# Patient Record
Sex: MALE | Race: WHITE | URBAN - METROPOLITAN AREA
[De-identification: names, ages, dates, MRNs, and addresses within clinical notes are randomized per-mention and may not be internally consistent; named-entity substitution may affect disease eponyms.]

---

## 2018-07-30 ENCOUNTER — HOSPITAL ENCOUNTER (OUTPATIENT)
Dept: OCCUPATIONAL THERAPY | Facility: CLINIC | Age: 38
Setting detail: THERAPIES SERIES
End: 2018-07-30
Attending: PHYSICAL MEDICINE & REHABILITATION
Payer: COMMERCIAL

## 2018-07-30 PROCEDURE — 97542 WHEELCHAIR MNGMENT TRAINING: CPT | Mod: GO | Performed by: OCCUPATIONAL THERAPIST

## 2018-07-30 PROCEDURE — 40000132 ZZH STATISTIC OT SEATING/WHEELED MOBILITY VISIT: Performed by: OCCUPATIONAL THERAPIST

## 2018-07-31 NOTE — PROGRESS NOTES
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 No   Have you fallen and had an injury in the past year? Yes  (yes femur fracture with fall in shower)   Is patient a fall risk? Yes   Medical History   Onset Of Illness/injury Or Date Of Surgery 6/20/118  (order)   Medical Diagnosis ALS   Recent or Planned Surgeries Femur fracture   Home Accessibility   Living Environment House   Primary Entrance Covered Ramp   All Rooms Wheelchair Accessible Yes   Community ADL   Transportation Adapted Vehicle   Adapted Vehicle Features Side Entry;Ramp;Lock Down System  (ez lock and tie downs)   Community Mobility Requirements Medical Appointments;Shopping;Mosque   Cognitive/Visual/Hearing Status   Observations No Problems Observed During Evaluation   Vision Intact   Hearing Intact   ADL Status   Feeding Independent   Grooming/Hygiene Independent   Dressing Requires Assist   Toileting Requires Assist;Uses Equipment  (grab bars, raised commod)   Bathing Requires Assist;Uses Equipment  (grab bars, )   Meal Preparation Unable   Home Management Unable   Fatigue   Fatigue Measure Score needs to take daily naps   Balance   Unsupported Sitting Balance Uses Upper Extremities for " Balance   Sitting Balance in Chair Uses Upper Extremities for Balance   Standing Balance Physical Assist Required   Ambulation   Ambulation Non Ambulatory   Ambulation Assist Requires Assist   Ambulation Equipment 2 Wheeled Walker   Ambulation Comments For exercise only a few steps at home   Transfers   Transfer Assist Moderate Assist;Independent   Transfer Method Stand Pivot   Transfer Comments Uses 2ww to pull up on and push from chair   Wheelchair Ability   Wheelchair Ability Quick Adds Manual Chair;Wheelchair Use   Manual Wheelchair Propulsion   Manual Wheelchair Propulsion Assist   Wheelchair Use   Ability to Perform Weight Shifts Assist   Bed Confined Without Wheelchair? Yes   Hours in Wheelchair Daily 10   Hours Spent Alone Daily 2   Neuromuscular   History of Pressure Sores No   Pain No   Coordination UE Intact;LE Impaired   Tone Spasticity  (LE, no longer taking baclofen)   Sensory Deficits Reported wfl   Head and Neck   Head and Neck Position Functional   Head Control Adequate   Upper Extremities   UE ROM full rom   UE Strength 4/5   Dominance Right   Pelvis   Anterior/Posterior Pelvis Position Posterior Tilt   Trunk   Anterior/Posterior Trunk Position Increased Thoracic Kyphosis   Lower Extremities   Hip Position Adducted;Partially Flexible   LE ROM L hip limited to 90 degrees active range, right hip full range, right knee full range althought weak at hip, knee lacking full extension   LE Strength R 3+/5 L 2+/5   Foot Positioning Plantar flexed   Patient Measurements   Knee to Heel (inches) 22   Hip to Knee (inches) 20   Hip to Elbow (inches) 11   Hip to Underarm (inches) 20   Hip to Shoulder (inches) 25   Hip to Head (inches) 34   Hip to Hip (inches) 15.5   Chest Width (inches) 14   Shoulder Width (inches) 19   Education Assessment   Barriers to Learning Physical   Preferred Learning Style Listening;Demonstration   Assessment/Plan   Criteria for Skilled Interventions Met Yes, Treatment Indicated    Treatment Diagnosis progressive weakness limits safe and independent participation with all MRADLS   Therapy Frequency once   Planned Therapy Interventions Wheelchair Management/Propulsion Training   Planned Therapy Interventions Comments Educated on process to obtain power w/c.  Demo'd use of chair and all seat functions.  Pool chair requested.   Risks and benefits of treatment have been explained Yes   Patient/family & other staff in agreement with plan of care Yes   Comments pool chair to be delivered by Peterson   Session Time   Total Treatment Time 90   Total Session Time 90   Certification   Certification date from 07/30/18   Certification date to 07/30/18   Adult OT Eval Goals   OT Eval Goals (Adult) 1   OT Goal 1   Goal Identifier power mobility   Goal Description Patient to demonstrate a successful clinical trial of the recommended wheelchair   Target Date 07/30/18   Electronically signed by:  Ruby REYES/AISSATOU, ATP      Occupational Therapist, Assistive   [phone]      fax: [phone]      [email or web address]  Seating Clinic- [facility], [address]  [address]  [place] [address]

## 2018-09-14 NOTE — PROGRESS NOTES
REQUISITION AND JUSTIFICATION FOR DURABLE MEDICAL EQUIPMENT    Patient Name:  Abe Dee  MR #:  [id number]  :  1980  Age/Gender:  37 year old male  Visit Date:  Abe Dee seen for seating and wheeled mobility evaluation by Ruby Roman OTR/L,ATP and ATP from [facility] on 18.    CLINICAL CRITERIA FOR MOBILITY ASSISTIVE EQUIPMENT  Coverage Criteria Per Local Coverage Determination  A) Abe has mobility limitations due to Amyotrophic Lateral Sclerosis (ALS) and femur fracture that significantly impairs his ability to participate in all of his mobility-related activities of daily living (MRADL). Specifically affected are toileting (being able to get there in time to prevent accidents), dressing, and bathing (getting into the bathroom of designated place). Current equipment used is a standard manual wheelchair and two wheeled walker. This patient needs the new equipment requested to be able to increase safe and independent participation in all MRADLS in a timley manner free from falls/injury. Please see additional documentation in the seating and wheeled mobility report for details.   Abe had a successful clinical trial here, and also a successful trial at home with the recommended equipment. Abe is very willing and physically / cognitively able to use the recommended equipment to assist his with mobility-related activities of daily living and general mobility. A group 3 power wheelchair is being requested because it has better suspension for a smooth ride and has the capabilities of expandable electronics to operate the  power seat functions Abe needs for independence with his activities of daily living. A Group 2 power wheelchair does not have sufficient electronics to support this patient's progressive neurological deficits due to ALS.  B) Abe's mobility limitation cannot be sufficiently and safely resolved by the use of an appropriately fitted cane or walker because he is non  ambulatory due to weakness.  Taking steps with a walker with assistance at home for exercise only. Strength of legs is R 3+/5 L 2+/5 for one maximal repetition. Fatigue also impacts this patient's ability to ambulate, regardless of the gait aid.    C) Abe does not have sufficient upper extremity function to self-propel an optimally-configured manual wheelchair in his home to perform MRADLs during a typical day due to limitations in strength, endurance, range of motion, and coordination. Distance and time to propel a light weight manual wheelchair NT due to limited energy and strength secondary to progressive disease.  Strength of arms is 4/5.  D)  Abe is not able to use a POV/scooter because it will not fit in his home environment. Abe is unable to safely transfer to and from a POV, unable to operate the [employer] steering system, and unable to maintain postural stability and position while operating the POV. Abe needs more appropriate seating and positioning than any scooter seat provides.  E) The need for this equipment is LIFETIME.   RECOMMENDATIONS FOR MOBILITY BASE, SEATING SYSTEM AND COMPONENTS  Quantum Q6 Edge 3MP-SS - this mid wheel drive power wheelchair is needed for this patient to continue to have independent mobility and to be able to allow him to complete or assist in all of his mobility related activities of daily living (MRADLs). This wheelchair will also have the seating and positioning system and seat function he needs to be able to use and tolerate the wheelchair full time, and have functional and comfortable positioning for a full day's activities. Abe has ALS which impairs his ability to move in his home without the use of the requested wheelchair. He lives in an home with ramp access and uses adapted vehocle for transportation.    100 amp Q-Logic 3 EX controller -  Needed for operation of the joystick for mobility and seat functions    Harness for expandable controller - needs to be inline  for communication between the controller and the joystick    Q-Logic3 EX Joystick - this is the joystick needed to be used by this patient for moving this wheelchair and also controlling the movement of the seat functions.    Retract 4 swing away joystick mount - needed to be able to move the joystick out of the way during transfers, or when at the desk using the computer, or at the table eating, so as not to inadvertently hit the joystick, thus moving the wheelchair or turning the power on or off without his knowledge.    DELIA Balance Power Tilt and Recline  - This seating function combination is needed for this patient to be able to perform independent weight shifts and also to be able to change him seated position without the request of a care giver. Abe requires a powered seating system with both tilt and recline to optimize pressure distribution and postural repositioning.   The power tilt seat allows him to change his position by rotating rearward without changing the angle of his hips or knees. Due to atrophy of his buttocks he is at high risk of developing pressure ulcers if not able to change his position. Due to compromised ability to exert himself for weight shifting, the power tilt seat allows him to do this by operating it through the joystick. He can also use a slight degree of tilt for assisting in his seating and positioning for normal functions during the day a to assist keeping him in a midline, erect and upright position by using the effect of gravity.   The power reclining back feature also reduces pressures by allowing Matt to change the angle of his hips and knees into a more open and supine / recumbent position. This  increases his tolerance and be able to remain in the requested wheelchair for a complete day and not be dependent on care givers to change his position or transfer him to another surface for comfort or resting. The recline feature can be used to access the perineal area  necessary for toileting assistance. The power tilt seat and power recline back are necessary features that allow tasks to be done by the care giver without the need for transferring back to bed for these activities.  The medical justification for these seat functions is consistent with the RESNA Position Papers regarding these seat function interventions (Jose C et al., 2009). These seat functions will also reduce upper extremity strain per the [employer] [place] [place] Guidelines for upper limb preservation (Hansinger, et al., 2005).    Height adjustable  allows for height adjustment of joystick in order to be placed at appropriate height when driving with weak UE.    Power elevating seat - Vertical movement is necessary to allow Abe to function and participate in a three-dimensional world. This seat feature will increase his ability to reach by bringing him closer for better access with weak arms while reaching into cupboards or closets.  During the home trial he was able to use this feature to increase ease and safety and transfers and ability to reach into high cupboards for independence with kitchen tasks.  He is transferring  bed<=> wheelchair with stand pivot transfer and up to moderate care giver assistance by pulling forward on walker. This requested seat lift feature promotes safety with and improved independence with lateral transfers by allowing a level transfer or transfer from a higher to lower surface, which is gravity-assisted.  It also facilitates forward transfer by allowing legs, hips to be more extended, thereby lessening the strength required for the user to perform a stand-pivot transfer.  Power seat elevation also allows the user to have eye contact with others and reduces cervical strain and pain (including headaches from poor positioning). Vertical rise also provides psycho-social benefits of being on peer level and speaking eye-to-eye. Additionally, seat elevation allows  "certain medications to be kept out of reach of children but remain accessible to the user.    DELIA comfort Solid curved and padded back support - firm and contoured back support is needed to support Abe Tomass thoracolumbar area in an upright and midline position, with appropriate support pads as needed. This will provide support whether he is in the upright or tilted/reclined position. This back support is essential to provide sufficient lateral contour to maximize his postural alignment and minimize his tendency to develop scoliosis and other secondary complications.    DELIA balance arm rest downpost- increased arm rest stability for use when pushhing up with transfers or repositioning.    Stealth Comfort Plus 10\" headrest and mounting hardware - needed to keep Debbies head in an erect, midline and upright position whether he is in the upright, tilted or reclined position. His head and neck need to be supported as well as the rest of his body.    Stealth worlds best mounting hardware - needed for mounting the requested headrest in the most appropriate position on the back of the wheelchair for optimal head and neck support and to be able to be removed for transfers.     Push Handles- corpus seat- two handles mounted to the backrest frame to allow the wheelchair to be pushed by a caregiver if the wheelchair user is unable to move the wheelchair (power source depleted, fine tuning position of wheelchair for vehicle transport, etc.)     Power elevating foot legrest- Allows for elevation and extension of lower extremities while elevating. This can improve circulation and prevent/reduce edema (with recline/tilt combination).  The lower legs of this wheelchair user act as a reservoir for fluid accumulation due to lack of movement. Elevation of this patient's legs above the level of the heart (left atrium) is recommended as part of the management of edema in conjunction with other measures such as support garments  This " patient has lower extremity dependent edema while sitting in his wheelchair, which resolves with elevation of his legs. This feature, when used with the power recline back or tilt seat, can increase Abe's sitting tolerance while positioning him in a more natural position. This can also be helpful if he fatigues and requires rests throughout the day, without transferring him back to bed.  Due to inability to perform a functional weight shifting, he is at risk for developing pressure ulcers. With the use of this feature it will allow for optimal weight shifting in conjunction with tilt and recline. Per RESNA white paper on elevating legrests, using elevating legrests and tilting more than 30 degrees in combination with full recline significantly improves lower leg hemodynamics status as measured by near-infrared spectroscopy (Rahul et al., 2010)  Additionally, these legrests can improve ground clearance to navigate thresholds and slopes and still allowing the legs to achieve a tight 90 degree position for typical driving conditions. This position shortens the overall functional wheelbase for improved maneuverability    Power Positioning electronics to be operated through the Q logic controller - this is needed to allow him to use the joystick of the wheelchair for control of mobility and operation of the power seat function. This is important for this patient with limited strength and coordination so as not to require a separate switch for operation of the seat function.    Calf supports- angle and height adjustable pads that attach to the legrests. These padded calf supports are necessary to support Matt lower legs when the leg is elevated, or to keep feet from falling behind the footplates when in the neutral seated position. Calf supports are especially important when using a tilt-in-space power seating system and/or power articulating elevating legrests. The padding provides an additional surface to distribute  pressure, and has a mild contour to accommodate the lower leg.    2 Batteries and  - gel sealed, and two are necessary to power the wheelchair. They are maintenance free and are safe for travel on the road or in the air. They are necessary to provide reliable use of the power wheelchair on a single charge.    ROHO quadtro seat cushion - this pressure distribution and positioning seat cushion will optimally  distribute seating pressures to prevent pressure ulcers, but also provide a stable base of support for him to use during MRADLs.    This equipment is reasonable and necessary with reference to accepted standards of medical practice and treatment of this patient's condition and is not being recommended as a convenience item. Without this recommended equipment, he is highly likely to sustain injuries from falls, develop pressure sores or postural compensation, and/or be bed confined, which those costs far exceed the cost of the requested equipment.    Electronically signed by:  Ruby YUNG, ATP      Occupational Therapist, Assistive   [phone]      fax: [phone]      [email or web address]  Seating Clinic- [facility], [address]  [place] [address]  September 14, 2018    I have read and concur with the above recommendations.    Physician Printed Name __________________________________________    Physician SIgnature  _____________________________________________    Date of SIgnature ______________________________    Physician Phone  ______________________________

## 2018-09-14 NOTE — ADDENDUM NOTE
Encounter addended by: Ruby Roman, OT on: 9/14/2018  2:30 PM<BR>     Actions taken: Sign clinical note

## 2018-09-25 ENCOUNTER — TRANSFERRED RECORDS (OUTPATIENT)
Dept: HEALTH INFORMATION MANAGEMENT | Facility: CLINIC | Age: 38
End: 2018-09-25

## 2019-04-30 ENCOUNTER — MEDICAL CORRESPONDENCE (OUTPATIENT)
Dept: HEALTH INFORMATION MANAGEMENT | Facility: CLINIC | Age: 39
End: 2019-04-30

## 2019-05-24 ENCOUNTER — HOSPITAL ENCOUNTER (OUTPATIENT)
Dept: PHYSICAL THERAPY | Facility: CLINIC | Age: 39
Setting detail: THERAPIES SERIES
End: 2019-05-24
Attending: PHYSICAL MEDICINE & REHABILITATION
Payer: MEDICARE

## 2019-05-24 PROCEDURE — 40000178 ZZH STATISTIC PT HOME VISIT-NEURO/BAL: Performed by: PHYSICAL THERAPIST

## 2019-05-24 PROCEDURE — 97161 PT EVAL LOW COMPLEX 20 MIN: CPT | Mod: GP | Performed by: PHYSICAL THERAPIST

## 2019-05-24 PROCEDURE — 97530 THERAPEUTIC ACTIVITIES: CPT | Mod: GP | Performed by: PHYSICAL THERAPIST

## 2019-05-24 PROCEDURE — 97760 ORTHOTIC MGMT&TRAING 1ST ENC: CPT | Mod: GP | Performed by: PHYSICAL THERAPIST

## 2019-05-24 PROCEDURE — 97535 SELF CARE MNGMENT TRAINING: CPT | Mod: GP,XU | Performed by: PHYSICAL THERAPIST

## 2019-05-24 NOTE — PROGRESS NOTES
[facility]      Outpatient Physical Therapy Evaluation  PLAN OF TREATMENT FOR OUTPATIENT REHABILITATION  (COMPLETE FOR INITIAL CLAIMS ONLY)  Patient's Last Name, First Name, M.I.  YOB: 1980  Abe Dee                           Provider's Name  [facility] Medical Record No.  [id number]                               Onset Date:  4/30/2019   Start of Care Date: 5/24/2019     Type: Physical Therapy Medical Diagnosis: ALS                        Therapy Diagnosis:  Impaired mobility   Visits from SOC:  1   _________________________________________________________________________________  Plan of Treatment:      Frequency/Duration: PT x 1 visit for transfer training    Goals:  Please refer to PT evaluation for goals for visit  _________________________________________________________________________________    I CERTIFY THE NEED FOR THESE SERVICES FURNISHED UNDER        THIS PLAN OF TREATMENT AND WHILE UNDER MY CARE .             Physician Signature               Date    X_____________________________________________________                      Certification date from: 5/24/19  Certification date to: 6/23/19    Referring Provider: Dr. Marycruz Stein MD

## 2019-05-24 NOTE — PROGRESS NOTES
Outpatient Physical Therapy Evaluation  [facility]    Type of Visit: Evaluation and treatment; Home safety evaluation for ALS patient    Date of Service: 5/24/2019    Physical Therapy Order/Referring Provider: PT eval & treat for transfer training / Dr. Marycruz Stein MD    Medical Diagnosis: Amyotrophic Lateral Sclerosis    Pertinent history of current problem (include personal factors and/or comorbidities that impact the plan of care): decline in strength related to progression of disease; patient and his family requesting home visit for transfer training, including use of mechanical lifts    Living/Social History: patient lives with his spouse & children in 1 level home that they have modified to be W/C accessible; his spouse & father are the primary caregivers    Current Equipment: power wheelchair; ceiling track lift system; standing style mechanical lift; rolling shower/commode chair with tilt feature; bipap; grab bars near toilet; bidet    Accessibility Issues: no concerns; they have ramped the entrance from the garage; all areas on the main level of the home are accessible for patient from his power wheelchair    EXAMINATION    Communication/Affect/Cognition: mild dysarthria; alert & oriented x 3    ROM/Strength Right PROM Left PROM Right MMT Left MMT     Shoulder flexion 150 120 3- 1   Shoulder abduction 140 90 3- 1   Shoulder external rotation 90 75 NT NT   Elbow flexion WNL WNL 4+ 2+   Elbow extension WNL WNL 4+ 2+   Wrist flexion WNL WNL 4 NT   Wrist extension WNL WNL 4 NT          Hip flexion WFL WFL 1+ 1   Hip extension NT NT NT NT   Hip abduction WFL WFL NT NT   Knee extension Ham tightness Ham tightness 3- 1   Knee flexion WFL WFL 2 0   Ankle dorsiflexion 0 w/knee flexed 0 w/knee flexed 0 0   Ankle plantar flexion WFL WFL 0 0              Spasticity (Modified Magdi Scale)   Right Left   Shoulder 2 2   Elbow 1+ 1+   Wrist 1 1   Hip 1 1   Knee 1+ 1+   Ankle 1 1           Locomotion: uses power wheelchair with right sided joystick independently within the home & community    Transfers: utilizes overhead ceiling lift for transfers in/out of bed; on/off toilet; on/off rolling shower/commode chair. They have a standing style lift that they utilize for lower body dressing    Balance: impaired due to weakness related to ALS; CGA for sitting edge of bed; total assist to stand    Fall Risk: increased related to weakness & increased tone from ALS; patient and his spouse report that he has not fallen in past year    Pain: denied    Educational Assessment:   Learner(s): Patient, his spouse and his father  Barriers to Learning: none    CLINICAL IMPRESSIONS  Physical Therapy Diagnosis: Impaired motor function and sensory integrity associated with progressive disorders of the central nervous system.    Impairments:    1) decreased ROM x 4 extremities  2) decreased strength x 4 extremities & trunk  3) increased tone x 4 extremities    Clinical Presentation: Evolving/Changing  Clinical Presentation Rationale: impaired mobility related to weakness, decreased flexibility and increased tone from disease progression of ALS  Clinical Decision Making (Complexity): Low complexity    Plan of Care:  Physical therapy intervention indicated. 1 session evaluation and treatment.    Goals: Patient stated goals (to be attained by end of PT visit on 5/24/2019)  1) Patient and his family with demonstrate independence with use of modified lateral pivot scoot transfer in order to complete safe transfers when traveling & mechanical lift is not available  2 ) Patient & his family will verbalize understanding of modified techniques for donning/doffing clothing while patient is in power wheelchair or supine in bed in order to complete dressing needs in the future with disease progression  3) Patient and his family will verbalize understanding of use of padded wire cervical collar to management head positioning &  decrease strain on cervical extensors to allow patient to participate in prolonged activities such as computer work & traveling in van    TREATMENT  Treatment provided this date:  ADL/home management training, 20 minutes  Therapeutic activities, 90 minutes  Orthotic training, 10 minutes    Skilled Intervention/Response to Treatment:  Provided education to patient, his spouse & his father in techniques to assist with caring for patient as ALS disease progression occurs. Instructed in use of drawsheet to assist with positioning in bed and provided education regarding use of sateen material to decrease friction to allow for greater ease for patient to assist as able (provided instructions for patient's mother to make). Provided education in technique to transition from supine to/from sitting thru sidelying to utilize when traveling & mechanical lift is not available. Instructed in technique to scoot patient's hips further back in wheelchair following transfers. Demonstrated modified lateral pivot scoot from wheelchair to/from bed with patient (max assist from PT) - patient tolerated well thus had patient's spouse & patient's father practice technique with PT assisting with caregiver positioning, proper body mechanics. By end of session, patient's father able to return demonstration of this technique to PT; patient's spouse verbalizes understanding and will continue to practice with patient & his father to increase her proficiency with this technique. Patient's spouse & father were able to return demonstration of assisted scooting technique, use of drawsheet & supine to/from sidelying to/from sitting by end of session. Patient's current ceiling track lift is not set up for use with sling - provided education to patient & his family in removing current lifting device & installing yoke for use with a sling. Instructed patient, his spouse & his father in proper application of sling from both sitting & supine positions -  "patient's spouse & father able to return demonstration of use of sling with current lift system. Provided education using illustrations to explain how typical \"trinh\" lift is similar to training that was completed in terms of sling application & hook up to lift; then educated regarding differences with need to move lift from one surface to next surface to complete transfers. Patient, his spouse & his father verbalize understanding of education provided regarding use of \"trinh\" style mechanical lift. Instructed in modified techniques for dressing from either supine position or from power wheelchair. Educated regarding increased ease of donning/doffing if clothes were 1 size larger than patient generally wears. Completed mock lower body dressing with patient seated in his power wheelchair (placed shorts on over his pants) to demonstrate the techniques. By end of visit, patient, his spouse and his father able to return demonstration of dressing technique from power wheelchair. Patient's spouse expressing concern with safely managing patient's head positioning with transfers & repositioning. Use of illustrations to provide education in use of cervical orthosis to assist with managing weakness. Provided education regarding pros/cons of various styles and success with using padded wire cervical collar with other ALS patients. Provided education regarding donning/doffing the padded wire cervical collar as well as how to mold & change collar positioning to meet patient's needs. By end of visit, patient, his spouse & his father verbalize understanding of education provided regarding cervical collar and are in agreement with recommendation to obtain collar for patient use. PT will contact MD for prescription for collar & patient requests that prescription be sent to [facility].     Goal Attainment: All goals met.    Risks and benefits of evaluation/treatment have been explained.  Patient and his family are in " agreement with Plan of Care      Timed Code Treatment Minutes: 120 minutes  Total Treatment Time (sum of timed & untimed services): 150 minutes       Signature/Credientials: Nyasia Feliciano, PT  Date: 5/24/2019    Certification:   Onset date: 4/30/2019 (referral)  Start of care date: 5/24/19  Certification date from 5/24/19 to 6/23/19

## 2019-05-28 NOTE — ADDENDUM NOTE
Encounter addended by: Nyasia Feliciano PT on: 5/28/2019 8:14 AM   Actions taken: Sign clinical note

## 2019-09-19 ENCOUNTER — THERAPY VISIT (OUTPATIENT)
Dept: SPEECH THERAPY | Facility: CLINIC | Age: 39
End: 2019-09-19
Payer: MEDICARE

## 2019-09-19 DIAGNOSIS — G12.21 ALS (AMYOTROPHIC LATERAL SCLEROSIS) (H): ICD-10-CM

## 2019-09-19 DIAGNOSIS — R47.1 DYSARTHRIA: Primary | ICD-10-CM

## 2019-09-19 NOTE — PROGRESS NOTES
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 No   Has the patient fallen and had an injury in the past year? No   Timed Up and Go Score: Not able to perform due to nonambulatory  Is the patient a fall risk? No  Referral initiated: No  Fall screen completed by: Speech Therapy  Fine Motor Assessment:  ROM: Limited  Accuracy of Point: Limited  Timing for Switch Activation: Limited  Accuracy with Head Mouse: Limited  Hearing Assessment:   Functional Hearing of patient: [facility]   Functional Hearing of Caregivers: [facility]  Visual Perceptive Skills: [facility]    Oral Mechanism Exam  Anomalies present:  severe deficits suggested, not formally assessed to focus on AAC training    Speech Assessment, Intelligibility/Functional Communication    Methods of communication: Augmentative and alternative communication (AAC)    Dysarthria: Severe   Speech: Deficits in phonation- Breathy quality and Strained-strangled quality (spastic)  Deficits in articulation- Decreased precision of articulation and Slow effortful rate  Deficits in resonance- Hypernasal  Intelligibility- less than 30%   Speech  "Intelligibility/Communication: Impacts daily activities, Impacts social activities, Impacts phone usage and Impacts ability to communicate basic wants/needs   Speech Intelligibility:   Familiar / Trained Listener  Word Level: Not functional  Sentence Level: Not functional  Conversation: Not functional   Unfamiliar / Untrained Listener  Word Level: Not functional  Sentence Level: Not functional  Conversation: Not functional    Language Assessment    Auditory Comprehension: WFL for use of SGD, able to follow directions throughout visit  Verbal Language: No overt deficits, WFL for use of SGD  Reading Comprehension: WFL for use of SGD  Written Language: WFL for use of SGD, uses keyboard to create novel messages indep    Cognition  Attention: WFL, Attends to entire session without redirects  Memory: WFL, recalls previous converstaions re AAC and wife denies concerns  Problem Solving: WFL, pt is able to state concerns and resolutions to mounting issues and device selection    Augmentative Alternative Communication - Specific Tasks  Trial #1  Equipment Trialed: mascotsecret system  Spelling Accuracy % correct = 100%  Access: gaze  Other Features: N/A  Examples of Functional Use During Trial: Able to indep access and utilize keyboard to create novel messages including \"Hi Amara\" and \"This is so cool\"  Trial #2  Equipment Trialed: TobiiNOMAD GOODSnavox I-15+  Spelling Accuracy % correct = 100%  Access: gaze  Other Features: none  Examples of Functional Use During Trial: Able to indep access and utilize keyboard to create novel messages including \"What do you want me to say?\"  Trial #3  Equipment Trialed: [employer] I-12+  Spelling Accuracy % correct = N/A  Access: gaze  Other Features: none  Examples of Functional Use During Trial: Not available for trial but information provided to compare size option for device selection      Impressions and Recommendations      Overall FAS Score/Level of Impairment:  1 = Severe " impairment  Communication Diagnosis: dysarthria  Summary: SLP demonstrated functional use of visuALS device and I-15+ with Communicator 5. Explained benefits and limitation of each as well as the EM12 and I-12+ device which were not available for demo today. After demonstration he prefers the Communicator 5 software but has not determined which device he prefers (EM12, I-12 or I-15). Any device would be appropriate and meet his needs. SLP educated regarding the funding process to receive a permanent device as well as option to use loaner device until he has determined which device he would like. He decided to take the I-15 device home with him today and trial use to determine if this device is too big. SLP will contact pt/wife to determine ongoing POC and likely complete a follow up visit to further assess functional use and initiate funding process.  The patient would benefit from a speech-generating device that incorporates the following features:  Gaze interaction, synthesized speech, ability to store messages, ability to create novel messages  Possible Devices: visuALS, TobiiDyanvox EM12, I-12+ or I-15+  Recommendations: Patient chose to bring home the I-15+ device on loan from [facility] to trial and determine if the larger device is too big. SLP will follow up to assess functional use, further train use, and assess for appropriateness of purchase of permanent device.  Frequency of Treatment: TBD  Duration of Treatment: TBD pending decision regarding device  Candidacy for Communication System: yes  Prognosis:  Good with intervention    Educational Assessment   Learners- Patient and Significant other   Barriers to Learning- No barriers.  Education provided/response: verbalized and demonstrated understanding, agreed to POC    Goals -    Goal #1: Patient will demonstrate basic operation of SGD (powering, positioning, charging, etc) in 90% of trials  Target Date: 12/19/19   Date Met: ongoing     Goal #2: Patient will  demonstrate complex operation of SGD (saving message, calibrating eye gaze, modifying settings, etc) in 90% of trials  Target Date: 12/19/19   Date Met: ongoing      Goal #3: Patient will utilize rate enhancement techniques to improve functional participation in conversation in 90% of opportunities  Target Date: 12/19/19   Date Met: ongoing      Goal #4: Patient will use device functionally to communicate his wants and needs x10 as measured by his wife report  Target Date: 12/19/19   Date Met: ongoing        The risks and benefits of treatment have been explained to the patient, family, and/or caregiver.  These results, goals, and recommendations were discussed and agreed upon. Thank you for your referral of this patient.    Timed Code Treatment Minutes: 63 minutes  Total Treatment Time (sum of timed and untimed services): 63 minutes    Certification:  Onset date: 9/19/19  Start of care date: 9/19/2019  Certification date from 9/19/2019 to 9/19/19    I CERTIFY THE NEED FOR THESE SERVICES FURNISHED UNDER        THIS PLAN OF TREATMENT AND WHILE UNDER MY CARE     (Physician attestation of this document indicates review and certification of the therapy plan).

## 2019-11-21 ENCOUNTER — MEDICAL CORRESPONDENCE (OUTPATIENT)
Dept: HEALTH INFORMATION MANAGEMENT | Facility: CLINIC | Age: 39
End: 2019-11-21

## 2019-11-26 ENCOUNTER — HOSPITAL ENCOUNTER (OUTPATIENT)
Dept: SPEECH THERAPY | Facility: CLINIC | Age: 39
Setting detail: THERAPIES SERIES
End: 2019-11-26
Attending: PSYCHIATRY & NEUROLOGY
Payer: MEDICARE

## 2019-11-26 PROCEDURE — 92609 USE OF SPEECH DEVICE SERVICE: CPT | Mod: GN | Performed by: SPEECH-LANGUAGE PATHOLOGIST

## 2019-11-26 PROCEDURE — 92522 EVALUATE SPEECH PRODUCTION: CPT | Mod: GN | Performed by: SPEECH-LANGUAGE PATHOLOGIST

## 2019-11-27 ENCOUNTER — MEDICAL CORRESPONDENCE (OUTPATIENT)
Dept: HEALTH INFORMATION MANAGEMENT | Facility: CLINIC | Age: 39
End: 2019-11-27

## 2019-11-27 NOTE — PROGRESS NOTES
OUTPATIENT SPEECH-LANGUAGE PATHOLOGY   AUGMENTATIVE AND ALTERNATIVE COMMUNICATION EVALUATION    General Information   Type of Visit: Evaluation  Total Evaluation Time: 53 minutes  Start of Care Date: 11/26/19  Referring Physician: Dr Taras Nelson  Orders: Speech Therapy, Augmentative Communication  Date of Order: 11/21/19  Medical Diagnosis: ALS  Onset of Illness/Injury or Date of Surgery: 08/2016  Precautions/Limitations: wheelchair bound, limited functional communication  Special Instructions: none  Pertinent History of Current Problem: limb onset ALS dx 8/2016 now with significant bulbar involvement  Patient/Family Goals: to e learn how to use new eye gaze AAC device  Dominant Hand:  right  Education:  unknown  Living Status:  With wife and two children  Occupation:  retired   present: No    General Assessments  Motor Skills: nonambulatory  Fall Risk Screen:   Has the patient fallen 2 or more times in the last year? No   Has the patient fallen and had an injury in the past year? No   Timed Up and Go Score: Not able to perform due to nonambulatory  Is the patient a fall risk? No  Referral initiated: No  Fall screen completed by: Speech Therapy  Fine Motor Assessment:  ROM: Limited  Accuracy of Point: Limited  Timing for Switch Activation: Limited  Accuracy with Head Mouse: Limited  Hearing Assessment:   Functional Hearing of patient: [facility]   Functional Hearing of Caregivers: [facility]  Visual Perceptive Skills: [facility]    Oral Mechanism Exam  Anomalies present:  severe deficits suggested, not formally assessed to focus on AAC training    Speech Assessment, Intelligibility/Functional Communication    Methods of communication: Augmentative and alternative communication (AAC)    Dysarthria: Profound (anarthria)  Speech: Anarthria- no functional speech   Speech Intelligibility/Communication: Impacts daily activities, Impacts social activities, Impacts phone usage and Impacts ability to communicate basic  "wants/needs    Language Assessment    Auditory Comprehension: WFL for use of AAC system, able to follow directions throughout visit  Verbal Language: WFL for use of AAC system, no deficits suggested during AAC use  Reading Comprehension: WFL for use of AAC system  Written Language: WFL for use of AAC system, uses keyboard to create novel messages independently    Cognition  Attention: WFL for use of AAC system, Attends to entire session without redirects  Memory: WFL for use of AAC system, recalls previous converstaions re AAC and wife denies concerns  Problem Solving: WFL for use of AAC system, pt assisted to troubleshoot issues with cell phone connection to device, etc    Augmentative Alternative Communication - Specific Tasks  Trial #1  Equipment Trialed: TobiiDynavox I-13 with InspireMDator 5 software  Spelling Accuracy % correct = 100%  Access: eye gaze, switch activation  Other Features: [employer] keyboard, saved messages, rate enhancement options  Examples of Functional Use During Trial: He was able to independently use keyboard to create novel messages to answer SLPs questions. After brief education he was able to create category of messages and enter a commonly used phrase (\"How was your day?\").  Trial #2  Equipment Trialed: No other devices trialed as he just received his funded TobiiDynavox I-13      Impressions and Recommendations      Overall FAS Score/Level of Impairment:  1 = Severe impairment  Communication Diagnosis: dysarthria/anarthria  Summary: Patient received his funded TobiiDynavox I-13 device last week and was re-evaluated today via a home visit (due to difficult leaving home) to assess speech intelligibility and appropriate use of AAC device. Following setup and training he was able to appropriate use the device for communication and remains an excellent candidate for long term functional use.   SLP provide training regarding device's functional use and also configured device to meet his needs. " SLP trained proper positioning of device for gaze interaction, calibration process to improve accuracy and efficiency, trained functional use of keyboard and all it's rate enhancement features (word and phrase prediction). SLP also trained and demonstrated process for saving commonly used messages for easy and efficient access to communication. Per patient and wife request SLP assisted to link his smartphone and email account to the device to allow long distance communication. Patient had completed Voice Banking via [employer] and SLP provided training regarding process to install/upload his personalized voice (pt and wife will complete). SLP attempted to link his hospital bed remote to device but was unsuccessful, encouraged them to contact tech support for further assistance.    The patient would benefit from a speech-generating device that incorporates the following features: Gaze interaction, synthesized speech, ability to store messages, ability to create novel messages  Possible Devices: TobiiDynavox I-13  Recommendations: Continue training and configuration to meet his needs  Frequency of Treatment: 2x/month  Duration of Treatment: 1 month  Candidacy for Communication System: yes  Prognosis:  Good with intervention    Educational Assessment   Learners- Patient and Significant other   Barriers to Learning- No barriers.  Education provided/response: verbalized and demonstrated understanding, agreed to POC    Goals -    Goal #1: Patient will demonstrate basic operation of SGD (powering, positioning, charging, etc) in 90% of trials  Target Date: 12/31/19 (extended dates from previous evaluation due to delayed receipt of equipment)  Date Met: ongoing     Goal #2: Patient will demonstrate complex operation of SGD (saving message, calibrating eye gaze, modifying settings, etc) in 90% of trials  Target Date: 12/31/19 (extended dates from previous evaluation due to delayed receipt of equipment)  Date Met:  ongoing      Goal #3: Patient will utilize rate enhancement techniques to improve functional participation in conversation in 90% of opportunities  Target Date: 12/31/19 (extended dates from previous evaluation due to delayed receipt of equipment)   Date Met: ongoing      Goal #4: Patient will use device functionally to communicate his wants and needs x10 as measured by his wife report  Target Date: 12/19/19   Date Met: ongoing        The risks and benefits of treatment have been explained to the patient, family, and/or caregiver.  These results, goals, and recommendations were discussed and agreed upon. Thank you for your referral of this patient.    Timed Code Treatment Minutes: 0 minutes timed code  Total Treatment Time (sum of timed and untimed services): 45 minutes    Certification:  Onset date: 11/26/19  Start of care date: 11/26/2019  Certification date from 11/26/2019 to 11/26/19    I CERTIFY THE NEED FOR THESE SERVICES FURNISHED UNDER        THIS PLAN OF TREATMENT AND WHILE UNDER MY CARE .             Physician Signature               Date    X_____________________________________________________                      .

## 2019-12-02 ENCOUNTER — HOSPITAL ENCOUNTER (OUTPATIENT)
Dept: PHYSICAL THERAPY | Facility: CLINIC | Age: 39
Setting detail: THERAPIES SERIES
End: 2019-12-02
Attending: PHYSICAL MEDICINE & REHABILITATION
Payer: MEDICARE

## 2019-12-02 PROCEDURE — 97530 THERAPEUTIC ACTIVITIES: CPT | Mod: GP | Performed by: PHYSICAL THERAPIST

## 2019-12-02 PROCEDURE — 97535 SELF CARE MNGMENT TRAINING: CPT | Mod: GP | Performed by: PHYSICAL THERAPIST

## 2019-12-02 PROCEDURE — 97161 PT EVAL LOW COMPLEX 20 MIN: CPT | Mod: GP | Performed by: PHYSICAL THERAPIST

## 2019-12-03 NOTE — PROGRESS NOTES
"OUTPATIENT PHYSICAL THERAPY CLINIC NOTE  Abe Dee     YOB: 1980  [id number]    Type of visit:         Evaluation & Treatment     Date of service: 2019    Referring provider: Dr. Edy Ross     present: No  Language: English    Others present at visit:  Spouse / significant other    Medical diagnosis:   Amyotrophic lateral sclerosis (ALS)    Pertinent history of current problem (include personal factors and/or comorbidities that impact the plan of care): patient and his spouse reporting decline in core strength resulting in inability to safely use rigid \"sling\" for ceiling track lift system - requesting home visit to instruct in use of universal sling    Living environment:  House    Living environment barriers:  Ramp(s) present     Current assistance/living environment:  Lives with spouse & 2 children      Current mobility equipment:  Gait belt  Orthotics: bilateral AFOs  Mechanical lift - ceiling track lift system  Power wheelchair  Wheelchair cushion     Current ADL equipment:  Rolling shower/commode chair    Technology used: cell phone, tablet    Evaluation   Interview completed.   Pain assessment:  Pain present  Location: bilateral shoulders/Ratin/10 at best, 5/10 at worst  Location: low back/Ratin/10 at best, 5/10 at worst     Range of motion: limited shoulder ROM, limited ankle ROM    Tone (use of modified Magdi scale): shoulders 2/4 bilaterally; elbows 1+/4 bilaterally; wrists 1/4 bilaterally; hips 1/4 bilaterally; knees 1+/4 bilaterally; ankles 1/4 bilaterally    Manual muscle testing: shoulder flex 1+/5 on right, 0/5 on left; shoulder abd 1+/5 on right, 0/5 on left; elbow flex 3-/5 on right, 2/5 on left; elbow ext 2/5 on right, 2-/5 on left; hip flex 1/5 on right, 0/5 on left; knee ext 2+/5 on right, 1/5 on left; knee flex 2-/5 on right, 0/5 on left; ankle dorsiflex & plantar flex 0/5 bilaterally   Transfers: use of ceiling lift system   Locomotion: pt is " "independent with use of power W/C with right joystick control within the home environment    Fall Risk Screen:   Has the patient fallen 2 or more times in the last year? No      Has the patient fallen and had an injury in the past year? No       Timed Up and Go Score: Not able to perform due to pt is nonambulatory    Is the patient a fall risk? Yes, department fall risk interventions implemented     Impairments:  Fatigue  Muscle atrophy  Balance  Pain  Range of motion  Increased tone     Treatment diagnosis:  Impaired mobility  Impaired activities of daily living    Clinical Presentation: Evolving/Changing  Clinical Presentation Rationale: based upon subjective information provided, objective exam findings, medical history & clinical judgement   Clinical Decision Making (Complexity): Low complexity     Recommendations/Plan of care:  1 session evaluation & treatment.     Goals:   Target date: 12/2/19  Patient and his spouse will verbalize/demonstrate understanding of compensatory methods /equipment to enhance functional independence and safety.  Patient and his spouse will verbalize/demonstrate safe transfer techniques with use of universal sling & ceiling track lift system.    Educational assessment/barriers to learning:   No barriers noted     Treatment provided this date:   Therapeutic activities, 75 minutes  ADL/Home Mgmt, 15 minutes    Response to treatment/recommendations: Provided training in use of universal sling to replace the rigid style \"sling\" that patient & his spouse have been using with ceiling track lift system. Instructed in application of sling in sitting & supine positions - patient with significant discomfort with rolling, thus able to instruct in placing sling when in bed by utilizing the adjustability features of the bed. Instructed in techniques to assure proper sitting positions when completing transfer to W/C, rolling shower/commode chair & bed. Instructed in use of drawsheet to assist with " reposition patient in bed and recommended moving bed to allow for improved access for spouse to assist. Provided education regarding making sateen drawsheet to facilitate boosting up in bed - instructions provided to create this drawsheet. Instructed in techniques to manage clothing both in supine & while seated in W/C. Provided resource for having athletic pants modified for easier donning/doffing. Provided education regarding addition of extension pads for padded wire cervical collar to prevent head extension during transfers, as well as possible use of Racquel cervical collar to provide support for head during transfers & with bathing. Patient and his spouse verbalize understanding of education provided. Will contact [employer] to see if there is a Racquel collar that patient could trial.     Goal attainment:  All goals met     Risks and benefits of evaluation/treatment have been explained.  Patient and his spouse are in agreement with Plan of Care.     Timed Code Treatment Minutes: 90 min  Total Treatment Time (sum of timed and untimed services): 120 min    Signature: Nyasia Feliciano, PT   Date: 12/2/2019    Certification:  Onset date: 11/27/2019  Start of care date: 12/2/2019  Certification date from 12/2/2019 to 01/01/2020    I CERTIFY THE NEED FOR THESE SERVICES FURNISHED UNDER        THIS PLAN OF TREATMENT AND WHILE UNDER MY CARE .             Physician Signature               Date    X_____________________________________________________

## 2019-12-05 NOTE — PROGRESS NOTES
[facility]      Outpatient Physical Therapy Evaluation  PLAN OF TREATMENT FOR OUTPATIENT REHABILITATION  (COMPLETE FOR INITIAL CLAIMS ONLY)  Patient's Last Name, First Name, M.I.  YOB: 1980  Abe Dee                           Provider's Name  [facility] Medical Record No.  [id number]                               Onset Date:  11/27/2019   Start of Care Date: 12/2/2019     Type: Physical Therapy Medical Diagnosis: ALS                        Therapy Diagnosis:  Impaired mobility & ADLs   Visits from SOC:  1   _________________________________________________________________________________  Plan of Treatment:      Frequency/Duration: PT x 1 visit    Goals:  Please refer to OP PT neuro progress note for goals for this visit  _________________________________________________________________________________    I CERTIFY THE NEED FOR THESE SERVICES FURNISHED UNDER        THIS PLAN OF TREATMENT AND WHILE UNDER MY CARE .             Physician Signature               Date    X_____________________________________________________                      Certification date from: 12/2/2019  Certification date to: 01/01/2020    Referring Provider: Dr. Edy Ross MD

## 2020-01-15 ENCOUNTER — TRANSFERRED RECORDS (OUTPATIENT)
Dept: HEALTH INFORMATION MANAGEMENT | Facility: CLINIC | Age: 40
End: 2020-01-15

## 2020-03-01 ENCOUNTER — HEALTH MAINTENANCE LETTER (OUTPATIENT)
Age: 40
End: 2020-03-01

## 2020-12-14 ENCOUNTER — HEALTH MAINTENANCE LETTER (OUTPATIENT)
Age: 40
End: 2020-12-14

## 2021-04-18 ENCOUNTER — HEALTH MAINTENANCE LETTER (OUTPATIENT)
Age: 41
End: 2021-04-18

## 2021-10-02 ENCOUNTER — HEALTH MAINTENANCE LETTER (OUTPATIENT)
Age: 41
End: 2021-10-02

## 2022-05-14 ENCOUNTER — HEALTH MAINTENANCE LETTER (OUTPATIENT)
Age: 42
End: 2022-05-14

## 2022-09-03 ENCOUNTER — HEALTH MAINTENANCE LETTER (OUTPATIENT)
Age: 42
End: 2022-09-03

## 2023-06-03 ENCOUNTER — HEALTH MAINTENANCE LETTER (OUTPATIENT)
Age: 43
End: 2023-06-03